# Patient Record
Sex: FEMALE | Race: WHITE | NOT HISPANIC OR LATINO | URBAN - METROPOLITAN AREA
[De-identification: names, ages, dates, MRNs, and addresses within clinical notes are randomized per-mention and may not be internally consistent; named-entity substitution may affect disease eponyms.]

---

## 2019-11-04 ENCOUNTER — OUTPATIENT (OUTPATIENT)
Dept: OUTPATIENT SERVICES | Facility: HOSPITAL | Age: 84
LOS: 1 days | End: 2019-11-04
Payer: MEDICARE

## 2019-11-04 VITALS
WEIGHT: 126.99 LBS | HEIGHT: 58.5 IN | TEMPERATURE: 98 F | SYSTOLIC BLOOD PRESSURE: 180 MMHG | DIASTOLIC BLOOD PRESSURE: 73 MMHG | RESPIRATION RATE: 15 BRPM | HEART RATE: 61 BPM

## 2019-11-04 DIAGNOSIS — K43.9 VENTRAL HERNIA WITHOUT OBSTRUCTION OR GANGRENE: ICD-10-CM

## 2019-11-04 DIAGNOSIS — Z01.818 ENCOUNTER FOR OTHER PREPROCEDURAL EXAMINATION: ICD-10-CM

## 2019-11-04 DIAGNOSIS — Z98.49 CATARACT EXTRACTION STATUS, UNSPECIFIED EYE: Chronic | ICD-10-CM

## 2019-11-04 LAB
ALBUMIN SERPL ELPH-MCNC: 3.4 G/DL — SIGNIFICANT CHANGE UP (ref 3.3–5)
ALP SERPL-CCNC: 67 U/L — SIGNIFICANT CHANGE UP (ref 30–120)
ALT FLD-CCNC: 21 U/L DA — SIGNIFICANT CHANGE UP (ref 10–60)
ANION GAP SERPL CALC-SCNC: -13 MMOL/L — LOW (ref 5–17)
AST SERPL-CCNC: 15 U/L — SIGNIFICANT CHANGE UP (ref 10–40)
BILIRUB SERPL-MCNC: 0.4 MG/DL — SIGNIFICANT CHANGE UP (ref 0.2–1.2)
BUN SERPL-MCNC: 22 MG/DL — SIGNIFICANT CHANGE UP (ref 7–23)
CALCIUM SERPL-MCNC: 9.1 MG/DL — SIGNIFICANT CHANGE UP (ref 8.4–10.5)
CHLORIDE SERPL-SCNC: 102 MMOL/L — SIGNIFICANT CHANGE UP (ref 96–108)
CO2 SERPL-SCNC: 26 MMOL/L — SIGNIFICANT CHANGE UP (ref 22–31)
CREAT SERPL-MCNC: 0.76 MG/DL — SIGNIFICANT CHANGE UP (ref 0.5–1.3)
GLUCOSE SERPL-MCNC: 107 MG/DL — HIGH (ref 70–99)
HCT VFR BLD CALC: 37.7 % — SIGNIFICANT CHANGE UP (ref 34.5–45)
HGB BLD-MCNC: 12.4 G/DL — SIGNIFICANT CHANGE UP (ref 11.5–15.5)
MCHC RBC-ENTMCNC: 30.2 PG — SIGNIFICANT CHANGE UP (ref 27–34)
MCHC RBC-ENTMCNC: 32.9 GM/DL — SIGNIFICANT CHANGE UP (ref 32–36)
MCV RBC AUTO: 92 FL — SIGNIFICANT CHANGE UP (ref 80–100)
NRBC # BLD: 0 /100 WBCS — SIGNIFICANT CHANGE UP (ref 0–0)
PLATELET # BLD AUTO: 367 K/UL — SIGNIFICANT CHANGE UP (ref 150–400)
POTASSIUM SERPL-MCNC: 4 MMOL/L — SIGNIFICANT CHANGE UP (ref 3.5–5.3)
POTASSIUM SERPL-SCNC: 4 MMOL/L — SIGNIFICANT CHANGE UP (ref 3.5–5.3)
PROT SERPL-MCNC: 7.1 G/DL — SIGNIFICANT CHANGE UP (ref 6–8.3)
RBC # BLD: 4.1 M/UL — SIGNIFICANT CHANGE UP (ref 3.8–5.2)
RBC # FLD: 14.1 % — SIGNIFICANT CHANGE UP (ref 10.3–14.5)
SODIUM SERPL-SCNC: 115 MMOL/L — CRITICAL LOW (ref 135–145)
WBC # BLD: 6.11 K/UL — SIGNIFICANT CHANGE UP (ref 3.8–10.5)
WBC # FLD AUTO: 6.11 K/UL — SIGNIFICANT CHANGE UP (ref 3.8–10.5)

## 2019-11-04 PROCEDURE — 85027 COMPLETE CBC AUTOMATED: CPT

## 2019-11-04 PROCEDURE — 93005 ELECTROCARDIOGRAM TRACING: CPT

## 2019-11-04 PROCEDURE — 93010 ELECTROCARDIOGRAM REPORT: CPT

## 2019-11-04 PROCEDURE — G0463: CPT

## 2019-11-04 PROCEDURE — 36415 COLL VENOUS BLD VENIPUNCTURE: CPT

## 2019-11-04 PROCEDURE — 80053 COMPREHEN METABOLIC PANEL: CPT

## 2019-11-04 RX ORDER — CHLORHEXIDINE GLUCONATE 213 G/1000ML
1 SOLUTION TOPICAL DAILY
Refills: 0 | Status: DISCONTINUED | OUTPATIENT
Start: 2019-11-07 | End: 2019-11-22

## 2019-11-04 RX ORDER — BUPIVACAINE HCL/PF 7.5 MG/ML
100 VIAL (ML) INJECTION
Refills: 0 | Status: DISCONTINUED | OUTPATIENT
Start: 2019-11-07 | End: 2019-11-22

## 2019-11-04 NOTE — H&P PST ADULT - NSICDXPASTMEDICALHX_GEN_ALL_CORE_FT
PAST MEDICAL HISTORY:  GERD (gastroesophageal reflux disease)     Osteoporosis     Spigelian hernia right

## 2019-11-04 NOTE — H&P PST ADULT - HISTORY OF PRESENT ILLNESS
85 yo female is scheduled for right spigelian hernia repair with on q pain pump on 11/7/19 with Dr Birmingham at [facility].   She had RLQ abdominal pain 2 weeks ago followed with CT scan and diagnosed with hernia advised removal. 83 yo female is scheduled for right spigelian hernia repair with on q pain pump on 11/7/19 with Dr Birmingham at [facility].   She had RLQ abdominal pain 2 weeks ago followed with CT scan and diagnosed with hernia advised repair.

## 2019-11-04 NOTE — H&P PST ADULT - NSANTHOSAYNRD_GEN_A_CORE
No. HAJA screening performed.  STOP BANG Legend: 0-2 = LOW Risk; 3-4 = INTERMEDIATE Risk; 5-8 = HIGH Risk

## 2019-11-04 NOTE — ASU DISCHARGE PLAN (ADULT/PEDIATRIC) - ASU DC SPECIAL INSTRUCTIONSFT
Please call Dr. MARCELO Birmingham's office ([phone]) to schedule a follow-up appointment to be seen in 7-10 days.    REST! Apply ice packs to incision sites 20 mins on, 20 mins off every 4 hours for first 24 hours.  If taking narcotic pain medications, may take COLACE (OTC stool softener) as directed to prevent constipation. Please call Dr. MARCELO Birmingham's office ([phone]) to schedule a follow-up appointment to be seen in 7-10 days.    REST! Apply ice packs to incision sites 20 mins on, 20 mins off every 4 hours for first 24 hours.  If taking narcotic pain medications, may take COLACE (OTC stool softener) as directed to prevent constipation.    Demonstrate and educate patient on Q pump and function of white clip which is to remain open so as not to crimp tubing and allow flow of medication.

## 2019-11-04 NOTE — ASU DISCHARGE PLAN (ADULT/PEDIATRIC) - CARE PROVIDER_API CALL
Maksim Birmingham (MD)  Surgery; Surgical Critical Care  [facility], [address] [place] [address]  Phone: [phone]  Fax: [phone]  Follow Up Time:

## 2019-11-04 NOTE — PROVIDER CONTACT NOTE (CRITICAL VALUE NOTIFICATION) - SITUATION
Spoke with nurse at Dr. Ortiz's office (covering MD for Dr. Last) and reported critical lab value Na 115. Also notified Corline at Dr. Birmingham's office of result. She will inform Dr. Birmingham, call PCP and patient to address ASAP. Result faxed to PCP and surgeon with request for treatment, repeat, and comment in clearance.

## 2019-11-04 NOTE — H&P PST ADULT - NSICDXFAMILYHX_GEN_ALL_CORE_FT
FAMILY HISTORY:  Father  Still living? Unknown  Family history of pancreatic cancer, Age at diagnosis: Age Unknown    Sibling  Still living? No  Family history of pancreatic cancer, Age at diagnosis: Age Unknown    Child  Still living? Yes, Estimated age: 51-60  Family history of breast cancer, Age at diagnosis: Age Unknown  Family history of Hashimoto thyroiditis, Age at diagnosis: Age Unknown

## 2019-11-04 NOTE — ASU DISCHARGE PLAN (ADULT/PEDIATRIC) - CALL YOUR DOCTOR IF YOU HAVE ANY OF THE FOLLOWING:
Inability to tolerate liquids or foods/Nausea and vomiting that does not stop/Increased irritability or sluggishness/Fever greater than (need to indicate Fahrenheit or Celsius)/Unable to urinate/Numbness, tingling, color or temperature change to extremity/Bleeding that does not stop/Swelling that gets worse/Excessive diarrhea/Pain not relieved by Medications/Wound/Surgical Site with redness, or foul smelling discharge or pus

## 2019-11-04 NOTE — ASU DISCHARGE PLAN (ADULT/PEDIATRIC) - ACTIVITY LEVEL
No sports/gym/No heavy lifting/No tub baths No tub baths/No sports/gym/No excercise/No heavy lifting

## 2019-11-04 NOTE — H&P PST ADULT - ASSESSMENT
85 yo female is scheduled for right spigelian hernia repair with on Q pain pump on 11/7/19 with Dr Birmingham at [facility].

## 2019-11-04 NOTE — H&P PST ADULT - NSICDXPROBLEM_GEN_ALL_CORE_FT
PROBLEM DIAGNOSES  Problem: Spigelian hernia  Assessment and Plan: Right spigelian hernia repair with On Q pain pump on 11/7/19   pending medical clearance  Patient will see dentist tomorrow for exam of loose tooth  Instructions reviewed; best wishes offered

## 2019-11-06 ENCOUNTER — TRANSCRIPTION ENCOUNTER (OUTPATIENT)
Age: 84
End: 2019-11-06

## 2019-11-07 ENCOUNTER — OUTPATIENT (OUTPATIENT)
Dept: OUTPATIENT SERVICES | Facility: HOSPITAL | Age: 84
LOS: 1 days | End: 2019-11-07
Payer: MEDICARE

## 2019-11-07 VITALS
HEART RATE: 70 BPM | SYSTOLIC BLOOD PRESSURE: 148 MMHG | OXYGEN SATURATION: 96 % | DIASTOLIC BLOOD PRESSURE: 65 MMHG | RESPIRATION RATE: 15 BRPM

## 2019-11-07 VITALS
OXYGEN SATURATION: 96 % | DIASTOLIC BLOOD PRESSURE: 71 MMHG | RESPIRATION RATE: 15 BRPM | WEIGHT: 128.09 LBS | HEIGHT: 58.5 IN | HEART RATE: 72 BPM | TEMPERATURE: 98 F | SYSTOLIC BLOOD PRESSURE: 166 MMHG

## 2019-11-07 DIAGNOSIS — Z98.49 CATARACT EXTRACTION STATUS, UNSPECIFIED EYE: Chronic | ICD-10-CM

## 2019-11-07 DIAGNOSIS — K43.9 VENTRAL HERNIA WITHOUT OBSTRUCTION OR GANGRENE: ICD-10-CM

## 2019-11-07 PROCEDURE — 49590: CPT | Mod: RT

## 2019-11-07 PROCEDURE — 49505 PRP I/HERN INIT REDUC >5 YR: CPT | Mod: AS,RT

## 2019-11-07 PROCEDURE — 49505 PRP I/HERN INIT REDUC >5 YR: CPT | Mod: RT

## 2019-11-07 PROCEDURE — C1781: CPT

## 2019-11-07 PROCEDURE — C1889: CPT

## 2019-11-07 PROCEDURE — 49590: CPT | Mod: AS

## 2019-11-07 RX ORDER — ONDANSETRON 8 MG/1
4 TABLET, FILM COATED ORAL ONCE
Refills: 0 | Status: DISCONTINUED | OUTPATIENT
Start: 2019-11-07 | End: 2019-11-08

## 2019-11-07 RX ORDER — ESOMEPRAZOLE MAGNESIUM 40 MG/1
1 CAPSULE, DELAYED RELEASE ORAL
Qty: 0 | Refills: 0 | DISCHARGE

## 2019-11-07 RX ORDER — BUPIVACAINE HCL/PF 7.5 MG/ML
0 VIAL (ML) INJECTION
Qty: 0 | Refills: 0 | DISCHARGE
Start: 2019-11-07

## 2019-11-07 RX ORDER — DENOSUMAB 60 MG/ML
0 INJECTION SUBCUTANEOUS
Qty: 0 | Refills: 0 | DISCHARGE

## 2019-11-07 RX ORDER — HYDROMORPHONE HYDROCHLORIDE 2 MG/ML
0.5 INJECTION INTRAMUSCULAR; INTRAVENOUS; SUBCUTANEOUS
Refills: 0 | Status: DISCONTINUED | OUTPATIENT
Start: 2019-11-07 | End: 2019-11-08

## 2019-11-07 RX ORDER — ENOXAPARIN SODIUM 100 MG/ML
30 INJECTION SUBCUTANEOUS ONCE
Refills: 0 | Status: DISCONTINUED | OUTPATIENT
Start: 2019-11-07 | End: 2019-11-22

## 2019-11-07 RX ORDER — SODIUM CHLORIDE 9 MG/ML
1000 INJECTION, SOLUTION INTRAVENOUS
Refills: 0 | Status: DISCONTINUED | OUTPATIENT
Start: 2019-11-07 | End: 2019-11-08

## 2019-11-07 RX ADMIN — SODIUM CHLORIDE 100 MILLILITER(S): 9 INJECTION, SOLUTION INTRAVENOUS at 15:31

## 2019-11-07 NOTE — BRIEF OPERATIVE NOTE - NSICDXBRIEFPREOP_GEN_ALL_CORE_FT
PRE-OP DIAGNOSIS:  Spigelian hernia 07-Nov-2019 14:57:49 Ventral hernia without obstruction or gangrene Caty Sykes PRE-OP DIAGNOSIS:  Spigelian hernia 07-Nov-2019 14:57:49 Ventral hernia without obstruction or gangrene RIGHT side Caty Sykes

## 2019-11-07 NOTE — BRIEF OPERATIVE NOTE - NSICDXBRIEFPOSTOP_GEN_ALL_CORE_FT
POST-OP DIAGNOSIS:  Spigelian hernia 07-Nov-2019 14:58:40 Ventral hernia without obstruction or gangrene Caty Sykes POST-OP DIAGNOSIS:  Spigelian hernia 07-Nov-2019 14:58:40 Ventral hernia without obstruction or gangrene RIGHT side Caty Sykes

## 2019-11-07 NOTE — BRIEF OPERATIVE NOTE - NSICDXBRIEFPROCEDURE_GEN_ALL_CORE_FT
PROCEDURES:  Open repair of Spigelian hernia 07-Nov-2019 14:56:57  Caty Sykes PROCEDURES:  Open repair of Spigelian hernia 07-Nov-2019 14:56:57 RIGHT side Caty Sykes PROCEDURES:  Open repair of Spigelian hernia 07-Nov-2019 14:56:57 RIGHT side, with mesh, On Q pain pump insertion Caty Sykes

## 2020-02-28 PROBLEM — Z00.00 ENCOUNTER FOR PREVENTIVE HEALTH EXAMINATION: Status: ACTIVE | Noted: 2020-02-28

## 2021-03-22 PROBLEM — K21.9 GASTRO-ESOPHAGEAL REFLUX DISEASE WITHOUT ESOPHAGITIS: Chronic | Status: ACTIVE | Noted: 2019-11-04

## 2021-03-22 PROBLEM — K43.9 VENTRAL HERNIA WITHOUT OBSTRUCTION OR GANGRENE: Chronic | Status: ACTIVE | Noted: 2019-11-04

## 2021-03-22 PROBLEM — M81.0 AGE-RELATED OSTEOPOROSIS WITHOUT CURRENT PATHOLOGICAL FRACTURE: Chronic | Status: ACTIVE | Noted: 2019-11-04

## 2021-03-30 ENCOUNTER — OUTPATIENT (OUTPATIENT)
Dept: OUTPATIENT SERVICES | Facility: HOSPITAL | Age: 86
LOS: 1 days | End: 2021-03-30
Payer: MEDICARE

## 2021-03-30 ENCOUNTER — APPOINTMENT (OUTPATIENT)
Dept: ULTRASOUND IMAGING | Facility: IMAGING CENTER | Age: 86
End: 2021-03-30
Payer: MEDICARE

## 2021-03-30 DIAGNOSIS — Z98.49 CATARACT EXTRACTION STATUS, UNSPECIFIED EYE: Chronic | ICD-10-CM

## 2021-03-30 DIAGNOSIS — I10 ESSENTIAL (PRIMARY) HYPERTENSION: ICD-10-CM

## 2021-03-30 PROCEDURE — 93975 VASCULAR STUDY: CPT

## 2021-03-30 PROCEDURE — 93975 VASCULAR STUDY: CPT | Mod: 26
